# Patient Record
Sex: FEMALE | Race: WHITE
[De-identification: names, ages, dates, MRNs, and addresses within clinical notes are randomized per-mention and may not be internally consistent; named-entity substitution may affect disease eponyms.]

---

## 2019-02-13 ENCOUNTER — HOSPITAL ENCOUNTER (INPATIENT)
Dept: HOSPITAL 76 - WFO | Age: 29
LOS: 1 days | Discharge: HOME | End: 2019-02-14
Attending: OBSTETRICS & GYNECOLOGY | Admitting: OBSTETRICS & GYNECOLOGY
Payer: COMMERCIAL

## 2019-02-13 DIAGNOSIS — Z3A.39: ICD-10-CM

## 2019-02-13 DIAGNOSIS — D50.9: ICD-10-CM

## 2019-02-13 DIAGNOSIS — N96: ICD-10-CM

## 2019-02-13 DIAGNOSIS — Z87.59: ICD-10-CM

## 2019-02-13 DIAGNOSIS — N97.9: ICD-10-CM

## 2019-02-13 LAB
BASOPHILS NFR BLD AUTO: 0 10^3/UL (ref 0–0.1)
BASOPHILS NFR BLD AUTO: 0.5 %
EOSINOPHIL # BLD AUTO: 0.1 10^3/UL (ref 0–0.7)
EOSINOPHIL NFR BLD AUTO: 1.6 %
ERYTHROCYTE [DISTWIDTH] IN BLOOD BY AUTOMATED COUNT: 17.1 % (ref 12–15)
HGB UR QL STRIP: 8.9 G/DL (ref 12–16)
LYMPHOCYTES # SPEC AUTO: 2 10^3/UL (ref 1.5–3.5)
LYMPHOCYTES NFR BLD AUTO: 25.1 %
MCH RBC QN AUTO: 22.8 PG (ref 27–31)
MCHC RBC AUTO-ENTMCNC: 32.2 G/DL (ref 32–36)
MCV RBC AUTO: 71 FL (ref 81–99)
MONOCYTES # BLD AUTO: 0.8 10^3/UL (ref 0–1)
MONOCYTES NFR BLD AUTO: 10.4 %
NEUTROPHILS # BLD AUTO: 5 10^3/UL (ref 1.5–6.6)
NEUTROPHILS # SNV AUTO: 8.1 X10^3/UL (ref 4.8–10.8)
NEUTROPHILS NFR BLD AUTO: 62.4 %
PDW BLD AUTO: 6.9 FL (ref 7.9–10.8)
PLATELET # BLD: 342 10^3/UL (ref 130–450)
RBC MAR: 3.89 10^6/UL (ref 4.2–5.4)

## 2019-02-13 PROCEDURE — 36415 COLL VENOUS BLD VENIPUNCTURE: CPT

## 2019-02-13 PROCEDURE — 0HQ9XZZ REPAIR PERINEUM SKIN, EXTERNAL APPROACH: ICD-10-PCS | Performed by: OBSTETRICS & GYNECOLOGY

## 2019-02-13 PROCEDURE — 85025 COMPLETE CBC W/AUTO DIFF WBC: CPT

## 2019-02-13 PROCEDURE — 99213 OFFICE O/P EST LOW 20 MIN: CPT

## 2019-02-13 RX ADMIN — ACETAMINOPHEN PRN MG: 325 TABLET ORAL at 18:19

## 2019-02-13 RX ADMIN — SODIUM CHLORIDE, POTASSIUM CHLORIDE, SODIUM LACTATE AND CALCIUM CHLORIDE SCH MLS/HR: 600; 310; 30; 20 INJECTION, SOLUTION INTRAVENOUS at 08:57

## 2019-02-13 RX ADMIN — ACETAMINOPHEN PRN MG: 325 TABLET ORAL at 07:58

## 2019-02-13 RX ADMIN — DOCUSATE SODIUM SCH MG: 100 CAPSULE, LIQUID FILLED ORAL at 21:25

## 2019-02-13 RX ADMIN — SODIUM CHLORIDE, POTASSIUM CHLORIDE, SODIUM LACTATE AND CALCIUM CHLORIDE SCH: 600; 310; 30; 20 INJECTION, SOLUTION INTRAVENOUS at 16:47

## 2019-02-13 RX ADMIN — ACETAMINOPHEN PRN MG: 325 TABLET ORAL at 23:30

## 2019-02-13 RX ADMIN — CELECOXIB SCH MG: 100 CAPSULE ORAL at 21:25

## 2019-02-13 RX ADMIN — CELECOXIB SCH MG: 100 CAPSULE ORAL at 09:41

## 2019-02-13 RX ADMIN — SODIUM CHLORIDE, POTASSIUM CHLORIDE, SODIUM LACTATE AND CALCIUM CHLORIDE SCH MLS/HR: 600; 310; 30; 20 INJECTION, SOLUTION INTRAVENOUS at 02:30

## 2019-02-13 RX ADMIN — DOCUSATE SODIUM SCH MG: 100 CAPSULE, LIQUID FILLED ORAL at 09:42

## 2019-02-13 NOTE — DISCHARGE SUMMARY
DATE OF ADMISSION: 02/13/2019



DATE OF DISCHARGE:  02/14/2019

 

IDENTIFICATION

1.  A 28-year-old G8, P2-0-5-2 with a 39-4/7 week intrauterine pregnancy.

2.  Active labor.

3.  Iron deficiency anemia.

 

DIAGNOSES ON DISCHARGE

1.  A 28-year-old G8, P3-0-5-3 status post spontaneous vaginal delivery on 
02/13/2019.

2.  Iron deficiency anemia, status post IV iron transfusion on 02/13/2019.

3.  Stable condition.

 

BRIEF HISTORY:  Patient is a patient of [facility].  Due to the 
weather and no bed availability at the [facility], patient presented here 
with complaints of contractions.  Patient was found to be in active labor with a
cervical examination showing that she was 4 cm dilated and robyn every 3-4
minutes.  Patient was admitted to the hospital and given epidural for pain 
control.  She spontaneously ruptured her membranes, which were noted to be 
clear.  Fetal heart tones were reassuring throughout the intrapartum course.  
Patient spontaneously delivered a viable female infant, named Sadia.  Sadia's 
Apgars are 9 and 10 at one and five minutes respectively.  Patient sustained 
first-degree labial laceration on her labia majora on the left side and on the 
right superior labia minora.  They were all repaired with 4-0 Vicryl.  EBL was 
200 mL and there were no complications.  The placenta delivered spontaneously 
intact with a 3-vessel cord.  Baby girl Sadia weighed 3155 grams.  There were 
no complications.  



Patient's postpartum course has been unremarkable.  On postpartum day number 0, 
she is already ambulating and tolerating her regular diet.  She is urinating 
without difficulty, and her pain is controlled with NSAIDs and Tylenol.  Her 
lochia is described as light and the baby is latching well.  I anticipate 
sending Sadia to home on 02/14/2019.  Given her iron deficiency anemia, she was
given IV iron.  Patient will be discharged to home with instructions for no 
heavy lifting more than 10 pounds and to call should she have any worsening 
fevers, chills, abdominal pain, or vaginal bleeding.  Patient will be given a 
prescription for Vicodin, if she so desires, for breakthrough pain.  Patient may
follow up with either myself at [facility] or with her 
obstetrician/gynecologist at the [facility].

 

 

DD: 02/13/2019 14:49

TD: 02/13/2019 14:57

Job #: [id number]

IMANI

## 2019-02-13 NOTE — PROVIDER PROGRESS NOTE
Subjective





- Prog Note Date


Prog Note Date: 19


Prog Note Time: 14:41





- Subjective


Pt reports feeling: Improved


Subjective: 





Lucretia sitting in bed.  and mom visiting. Mom holding baby Sadia. Lucretia 

doing well. States her pain is controlled without vicodin. Urinating well and 

ambulating without difficulty. Normal lochia. Sadia latching well. Plan for 

discharge to home tomorrow if no change. 





Objective





- Vital Signs/Intake & Output


Vital Signs: 


                                Vital Signs x48h











  Temp Pulse Resp BP Pulse Ox


 


 19 13:24   76  18  95/58 L 


 


 19 11:41  98.8 F  79  16  88/49 L  100


 


 19 09:14  98.8 F  70  20  96/56 L  100


 


 19 08:34   74  18  96/63  100


 


 19 08:17   70  18  101/61  100


 


 19 07:40  98.2 F  67  18  102/61  100


 


 19 07:17   69  20  101/53 L 


 


 19 07:05   90  20  117/76  100


 


 19 06:50  97.7 F  119 H  20  106/63 


 


 19 06:42   87  20  124/62 











Intake & Output: 


                                 Intake & Output











 02/10/19 02/11/19 02/12/19 02/13/19





 23:59 23:59 23:59 23:59


 


Intake Total    1577.5


 


Output Total    900


 


Balance    677.5














- Objective


General Appearance: positive: No acute distress


Eyes Bilateral: positive: Normal inspection


Extremities: positive: Non-tender


Neurologic/Psychiatric: positive: Oriented x3, Mood/affect nml





- Lab Results


Fish Bones: 


                                 19 02:28





Other Labs: 


                               Lab Results x24hrs











  19 Range/Units





  02:28 


 


WBC  8.1  (4.8-10.8)  x10^3/uL


 


RBC  3.89 L  (4.20-5.40)  10^6/uL


 


Hgb  8.9 L  (12.0-16.0)  g/dL


 


Hct  27.7 L  (37.0-47.0)  %


 


MCV  71.0 L  (81.0-99.0)  fL


 


MCH  22.8 L  (27.0-31.0)  pg


 


MCHC  32.2  (32.0-36.0)  g/dL


 


RDW  17.1 H  (12.0-15.0)  %


 


Plt Count  342  (130-450)  10^3/uL


 


MPV  6.9 L  (7.9-10.8)  fL


 


Neut # (Auto)  5.0  (1.5-6.6)  10^3/uL


 


Lymph # (Auto)  2.0  (1.5-3.5)  10^3/uL


 


Mono # (Auto)  0.8  (0.0-1.0)  10^3/uL


 


Eos # (Auto)  0.1  (0.0-0.7)  10^3/uL


 


Baso # (Auto)  0.0  (0.0-0.1)  10^3/uL


 


Absolute Nucleated RBC  0.00  x10^3/uL


 


Nucleated RBC %  0.0  /100WBC














Assessment/Plan





- Problem List


(1) Delivery normal


Impression: 


29 yo  S/p 


Normal recovery


Routine care


S/p IV transfusion iron for iron deficiency anemia


Anticipate discharge to home tomorrow


D/C saline lock





[id number]

## 2019-02-13 NOTE — DELIVERY NOTE
Delivery Note





- Labor


Labor: positive: Spontaneous





- Infant Delivery Method


Infant Delivery Method: positive: Spontaneous vaginal delivery





- Birth Presentation


Birth Presentation: positive: Vertex, ANUJA - left occiput anterior





- Nuchal Cord


Nuchal Cord: positive: None





- Amniotic Fluid Description


Amniotic Fluid Description: positive: Clear





- Episiotomy Type


Episiotomy Type: positive: None





- Laceration


Laceration: positive: 1st degree, Labial





- Suture


Suture Type: positive: Vicryl


Suture Size: positive: 4-0





- Delivery Outcome


Delivery Outcome: positive: Livebirth





- 


[employer]: positive: Placed in direct skin contact with mother


[employer] sex: positive: Female


: 9


: 10





- Cord


Cord: positive: 3 vessels





- Placenta


Placenta: positive: Intact, Spontaneous





- Estimated Blood Loss


Estimated Blood Loss (in cc): 200





- Post Delivery Events


Post Delivery Events: positive: No post delivery events





- Delivery Comments (Free Text/Narrative)


Delivery Comments (Free Text/Narrative): 


27 yo  with a 39w4d IUP was complete and +2. She pushed twice over 1 

contraction and spontaneously delivered a viable female infant Desiree Degroot (Maggie). Apgars 9/10. Placenta delivered spontaneously, intact with 3VC. 

First degree labial lacerations (right superior minora and left inferior majora)

repaired with 4-0 vicryl.  mL. No complications. Placenta to medical 

waste.

## 2019-02-13 NOTE — PROVIDER PROGRESS NOTE
Subjective





- Prog Note Date


Prog Note Date: 19


Prog Note Time: 03:56





- Subjective


Pt reports feeling: Improved


Subjective: 


29 yo  with a 39w4d IUP admitted for active labor. S/p SROM this AM. Just

received epidural and feeling better.  Josh at bedside. 








Objective





- Vital Signs/Intake & Output


Reviewed Vital Signs: Yes


Vital Signs: 


                                Vital Signs x48h











  Temp Pulse Resp BP Pulse Ox


 


 19 01:27  97.9 F  72  22  119/79  100














- Objective


General Appearance: positive: No acute distress


Eyes Bilateral: positive: Normal inspection


Abdomen: positive: Non-tender (Gravid)


Neurologic/Psychiatric: positive: Oriented x3, Mood/affect nml





- Lab Results


Fish Bones: 


                                 19 02:28





Other Labs: 


                               Lab Results x24hrs











  19 Range/Units





  02:28 


 


WBC  8.1  (4.8-10.8)  x10^3/uL


 


RBC  3.89 L  (4.20-5.40)  10^6/uL


 


Hgb  8.9 L  (12.0-16.0)  g/dL


 


Hct  27.7 L  (37.0-47.0)  %


 


MCV  71.0 L  (81.0-99.0)  fL


 


MCH  22.8 L  (27.0-31.0)  pg


 


MCHC  32.2  (32.0-36.0)  g/dL


 


RDW  17.1 H  (12.0-15.0)  %


 


Plt Count  342  (130-450)  10^3/uL


 


MPV  6.9 L  (7.9-10.8)  fL


 


Neut # (Auto)  5.0  (1.5-6.6)  10^3/uL


 


Lymph # (Auto)  2.0  (1.5-3.5)  10^3/uL


 


Mono # (Auto)  0.8  (0.0-1.0)  10^3/uL


 


Eos # (Auto)  0.1  (0.0-0.7)  10^3/uL


 


Baso # (Auto)  0.0  (0.0-0.1)  10^3/uL


 


Absolute Nucleated RBC  0.00  x10^3/uL


 


Nucleated RBC %  0.0  /100WBC














Assessment/Plan





- Problem List


(1) Active labor at term


Impression: 


29 yo  with a  39w4d IUP


Active labor


SROM 0036, clear fluid


Reassuring fetal and maternal status


Epidural in place and working well.


Anticipate 


Anticipate postpartum IV iron transfusion given microcytic anemia

## 2019-02-13 NOTE — ANESTHESIA
Pre-Anesthesia VS, & Labs





- Diagnosis





desires labor epidural





- Procedure





labor epidural


Vital Signs: 





                                        











Temp Pulse Resp BP Pulse Ox


 


 36.6 C   72   22   119/79   100 


 


 02/13/19 01:27  02/13/19 01:27  02/13/19 01:27  02/13/19 01:27  02/13/19 01:27














                                        





Height                           5 ft 4 in


Weight (kg)                      63.503 kg











- NPO


Other (clears from now till delivery)





- Pregnancy


Is Patient Pregnant?: Yes





- Lab Results


Current Lab Results: 





Laboratory Tests





02/13/19 02:28: WBC 8.1, RBC 3.89 L, Hgb 8.9 L, Hct 27.7 L, MCV 71.0 L, MCH 22.8

L, MCHC 32.2, RDW 17.1 H, Plt Count 342, MPV 6.9 L, Neut # (Auto) 5.0, Lymph # 

(Auto) 2.0, Mono # (Auto) 0.8, Eos # (Auto) 0.1, Baso # (Auto) 0.0, Absolute 

Nucleated RBC 0.00, Nucleated RBC % 0.0








Fish Bones: 


                                 02/13/19 02:28








Home Medications and Allergies





Active Medications





Fentanyl (Fentanyl)  50 mcg IVP Q1H PRN


   PRN Reason: PAIN


Lactated Ringer's (Lr)  1,000 mls @ 150 mls/hr IV .Q6H40M JOY


   Last Admin: 02/13/19 02:30 Dose:  150 mls/hr


Ondansetron HCl (Zofran Inj)  4 mg IVP Q4H PRN


   PRN Reason: Nausea / Vomiting


Sodium Chloride (Normal Saline Flush 0.9%)  10 ml IVP PRN PRN


   PRN Reason: AS NEEDED PER PROVIDER ORDERS











Anes History & Medical History





- Anesthetic History


Anesthesia Complications: reports: No previous complications





- Medical History


Cardiovascular: reports: None


Pulmonary: reports: None


Smoking Status: Never smoker





Exam


General: Alert


Dental: WNL


Mouth Opening: Greater than 4 Fingerbreadths


Mallampati classification: II


Thyromental Distance: greater than 6 cm





Plan


Anesthesia Type: Epidural


Consent for Procedure(s) Verified and Reviewed: Yes


Code Status: Attempt Resuscitation


ASA classification: 2-Mild systemic disease


Is this case an emergency?: No

## 2019-02-13 NOTE — HISTORY & PHYSICAL EXAMINATION
DATE OF ADMISSION: 2019



 

Identification: 28-year-old, G8, P2-0-5-2, with a 39-4/7 week intrauterine 
pregnancy.  EDC is 2019.  LMP 2018.  A 7-week ultrasound was 
consistent with dates.

 

HISTORY OF PRESENT ILLNESS:  Patient is a patient of [facility], 
who presented here in Labor and Delivery, secondary to the base being full.  
Patient had complaints of contractions and was seen here.  Initial cervical 
examination by the RN showed she was 4 cm dilated, 100% effaced, and -1 station.
 She was robyn approximately every 3-4 minutes.  Patient was admitted to 
the hospital secondary to contractions.

 

This pregnancy has been remarkable for artificial reproductive technology.  This
pregnancy has been a result of IVF.  Patient, unfortunately, has a history of 
recurrent spontaneous abortions.  So far, the prenatal chart reflects an 
unremarkable pregnancy beyond the ART.  There has been no indication of abnormal
blood pressure issues, vaginal bleeding or hypertension.

 

PAST MEDICAL HISTORY:  Pain in the right sacroiliac joint.

 

PAST SURGICAL HISTORY

1.  D and C.

2.  Appendectomy.

 

ALLERGIES:  NO KNOWN DRUG ALLERGIES.

 

MEDICATIONS

1.  Prenatal vitamins.

2.  Iron sulfate.

 

SOCIAL HISTORY:  She denies any tobacco or alcohol.  She is  to Josh Degroot.  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 
in the [employer].  She has 2 boys at home.  One was born in , the next in .  

 

PAST OBSTETRICAL HISTORY.

1.  Five spontaneous abortions in , with only one of them resulting in 
dilatation and curettage.

2.  Two vaginal deliveries:  The first one of a male infant in , vaginal, at
39 weeks; the second vaginal delivery in  of a male infant at 37 weeks' 
gestation.

 

PAST GYNECOLOGICAL HISTORY:  Status post D and C for a previous spontaneous 
.

 

FAMILY HISTORY:  Noncontributory.

 

REVIEW OF SYSTEMS:  Negative, unless otherwise stated.  She denies any vaginal 
bleeding, nausea, vomiting, fevers or chills.

 

OBJECTIVE

VITAL SIGNS:  Temperature is 97.9, heart rate 72, blood pressure 119/79, 
respiratory rate 22, O2 saturations are 100%.  

GENERAL:  Patient is a well-developed, well-nourished,  female, in no 
apparent distress.  She is alert and oriented x3.

HEENT:  Within normal limits.

CARDIOVASCULAR:  Regular.  No murmurs or rubs.

PULMONARY:  Lungs are clear to auscultation bilaterally.

ABDOMEN:  Gravid, nontender.  EFW is 7 pounds.  Fetal heart tones with baseline 
in the 130s to 140s, reactive, category 1.  There are no fetal decelerations.  
Patient is spontaneously robyn approximately every 2-4 minutes.

 

PRENATAL LABORATORIES:  Reveal that she is GC/CT negative.  She is O positive, 
antibody screen negative, and her GBS screen is negative.  Today's CBC reveals a
white count of 8.1, H and H of 8.9 and 27.7, MCV is decreased at 71.0, platelets
342.

 

ASSESSMENT

1.  Patient is currently doing well with her epidural, which is in place and 
working well.

2.  Anticipate spontaneous vaginal delivery.

3.  Anticipate a postpartum IV iron transfusion, given her microcytic anemia.

 

 

cc: Gina Jiménez MD

DD: 2019 04:13

TD: 2019 04:24

Job #: [id number]

MTDD

## 2019-02-14 VITALS — DIASTOLIC BLOOD PRESSURE: 65 MMHG | SYSTOLIC BLOOD PRESSURE: 98 MMHG

## 2019-02-14 LAB
BASOPHILS NFR BLD AUTO: 0.1 10^3/UL (ref 0–0.1)
BASOPHILS NFR BLD AUTO: 0.5 %
EOSINOPHIL # BLD AUTO: 0.1 10^3/UL (ref 0–0.7)
EOSINOPHIL NFR BLD AUTO: 1 %
ERYTHROCYTE [DISTWIDTH] IN BLOOD BY AUTOMATED COUNT: 17.1 % (ref 12–15)
HGB UR QL STRIP: 8.5 G/DL (ref 12–16)
LYMPHOCYTES # SPEC AUTO: 2.2 10^3/UL (ref 1.5–3.5)
LYMPHOCYTES NFR BLD AUTO: 21.3 %
MCH RBC QN AUTO: 22.6 PG (ref 27–31)
MCHC RBC AUTO-ENTMCNC: 31.6 G/DL (ref 32–36)
MCV RBC AUTO: 71.5 FL (ref 81–99)
MONOCYTES # BLD AUTO: 0.7 10^3/UL (ref 0–1)
MONOCYTES NFR BLD AUTO: 6.6 %
NEUTROPHILS # BLD AUTO: 7.5 10^3/UL (ref 1.5–6.6)
NEUTROPHILS # SNV AUTO: 10.6 X10^3/UL (ref 4.8–10.8)
NEUTROPHILS NFR BLD AUTO: 70.6 %
PDW BLD AUTO: 6.8 FL (ref 7.9–10.8)
PLATELET # BLD: 304 10^3/UL (ref 130–450)
RBC MAR: 3.78 10^6/UL (ref 4.2–5.4)

## 2019-02-14 RX ADMIN — ACETAMINOPHEN PRN MG: 325 TABLET ORAL at 09:45

## 2019-02-14 RX ADMIN — ACETAMINOPHEN PRN MG: 325 TABLET ORAL at 04:17

## 2019-02-14 RX ADMIN — DOCUSATE SODIUM SCH MG: 100 CAPSULE, LIQUID FILLED ORAL at 09:45

## 2019-02-14 RX ADMIN — CELECOXIB SCH MG: 100 CAPSULE ORAL at 09:45
